# Patient Record
Sex: FEMALE | Race: WHITE | NOT HISPANIC OR LATINO | Employment: OTHER | URBAN - METROPOLITAN AREA
[De-identification: names, ages, dates, MRNs, and addresses within clinical notes are randomized per-mention and may not be internally consistent; named-entity substitution may affect disease eponyms.]

---

## 2024-09-05 ENCOUNTER — TELEPHONE (OUTPATIENT)
Dept: UROLOGY | Facility: CLINIC | Age: 74
End: 2024-09-05
Payer: MEDICARE

## 2024-09-05 DIAGNOSIS — N39.0 UTI (URINARY TRACT INFECTION): Primary | ICD-10-CM

## 2024-09-06 ENCOUNTER — OFFICE VISIT (OUTPATIENT)
Dept: UROLOGY | Facility: CLINIC | Age: 74
End: 2024-09-06
Payer: MEDICARE

## 2024-09-06 VITALS
HEIGHT: 63 IN | BODY MASS INDEX: 24.1 KG/M2 | OXYGEN SATURATION: 97 % | WEIGHT: 136 LBS | SYSTOLIC BLOOD PRESSURE: 130 MMHG | DIASTOLIC BLOOD PRESSURE: 78 MMHG | HEART RATE: 97 BPM

## 2024-09-06 DIAGNOSIS — N32.81 OAB (OVERACTIVE BLADDER): Primary | ICD-10-CM

## 2024-09-06 DIAGNOSIS — N39.0 FREQUENT UTI: ICD-10-CM

## 2024-09-06 LAB
BILIRUBIN, UA POC OHS: NEGATIVE
BLOOD, UA POC OHS: NEGATIVE
CLARITY, UA POC OHS: CLEAR
COLOR, UA POC OHS: YELLOW
GLUCOSE, UA POC OHS: NEGATIVE
KETONES, UA POC OHS: NEGATIVE
LEUKOCYTES, UA POC OHS: ABNORMAL
NITRITE, UA POC OHS: NEGATIVE
PH, UA POC OHS: 6
PROTEIN, UA POC OHS: NEGATIVE
SPECIFIC GRAVITY, UA POC OHS: <=1.005
UROBILINOGEN, UA POC OHS: 0.2

## 2024-09-06 RX ORDER — NITROFURANTOIN 25; 75 MG/1; MG/1
100 CAPSULE ORAL 2 TIMES DAILY
COMMUNITY
Start: 2024-09-03

## 2024-09-06 RX ORDER — PANTOPRAZOLE SODIUM 40 MG/1
TABLET, DELAYED RELEASE ORAL
COMMUNITY
Start: 2024-08-29

## 2024-09-06 RX ORDER — GABAPENTIN 600 MG/1
600 TABLET ORAL 3 TIMES DAILY
COMMUNITY
Start: 2024-08-25

## 2024-09-06 RX ORDER — PRAVASTATIN SODIUM 40 MG/1
TABLET ORAL
COMMUNITY
Start: 2024-08-21

## 2024-09-06 RX ORDER — SULFAMETHOXAZOLE AND TRIMETHOPRIM 800; 160 MG/1; MG/1
TABLET ORAL
COMMUNITY
Start: 2024-07-16

## 2024-09-06 RX ORDER — ALENDRONATE SODIUM TABLET 35 MG/1
TABLET ORAL
COMMUNITY
Start: 2024-07-21

## 2024-09-06 RX ORDER — CHLORDIAZEPOXIDE HYDROCHLORIDE 10 MG/1
10 CAPSULE, GELATIN COATED ORAL 2 TIMES DAILY
COMMUNITY
Start: 2024-08-27

## 2024-09-06 RX ORDER — LAMOTRIGINE 25 MG/1
25 TABLET ORAL 2 TIMES DAILY
COMMUNITY
Start: 2024-07-16

## 2024-09-06 RX ORDER — FUROSEMIDE 20 MG/1
TABLET ORAL
COMMUNITY
Start: 2024-07-30

## 2024-09-06 RX ORDER — METOPROLOL TARTRATE 25 MG/1
25 TABLET, FILM COATED ORAL
COMMUNITY
Start: 2024-08-30

## 2024-09-06 NOTE — PROGRESS NOTES
Subjective:       Patient ID: Amelia Pizarro is a 74 y.o. female.    Chief Complaint: Urinary Tract Infection      HPI: 74-year-old female new patient presents today for frequent urinary tract infections.  Patient reports around labor day she developed gross hematuria and severe dysuria with lower abdominal pain.  She was started on antibiotics which resolved her symptoms however several days after stopping her symptoms of lower abdominal pain and dysuria returned.  Patient reports she also begins experiencing urinary urgency and frequency.  She called her primary care doctor who started her on Macrobid which she is currently taking. Patient reports prior to her urinary tract infection she was experiencing some urinary urgency with leakage.  She has to wear Kotex pads during the day and is currently changing 3 large pads per day.  Today she reports despite being on Macrobid she is still experiencing some urinary urgency with leakage and dysuria at the very end of her void.       Past Medical History:   Past Medical History:   Diagnosis Date    Anemia, unspecified     Anxiety disorder, unspecified     Depression     Elevated cholesterol     Fluid retention in legs     GERD with esophagitis     HTN (hypertension)     Neuropathy     Stroke        Past Surgical Historical:   Past Surgical History:   Procedure Laterality Date    blood disease      BRAIN SURGERY      SURGICAL REMOVAL OF ULCER      TUMOR REMOVAL          Medications:   Medication List with Changes/Refills   Current Medications    ALENDRONATE (FOSAMAX) 35 MG TABLET        CHLORDIAZEPOXIDE (LIBRIUM) 10 MG CAPSULE    Take 10 mg by mouth 2 (two) times daily.    FUROSEMIDE (LASIX) 20 MG TABLET        GABAPENTIN (NEURONTIN) 600 MG TABLET    Take 600 mg by mouth 3 (three) times daily.    LAMOTRIGINE (LAMICTAL) 25 MG TABLET    Take 25 mg by mouth 2 (two) times daily.    METOPROLOL TARTRATE (LOPRESSOR) 25 MG TABLET    Take 25 mg by mouth.    NITROFURANTOIN,  MACROCRYSTAL-MONOHYDRATE, (MACROBID) 100 MG CAPSULE    Take 100 mg by mouth 2 (two) times daily.    PANTOPRAZOLE (PROTONIX) 40 MG TABLET        PRAVASTATIN (PRAVACHOL) 40 MG TABLET        SULFAMETHOXAZOLE-TRIMETHOPRIM 800-160MG (BACTRIM DS) 800-160 MG TAB            Past Social History:   Social History     Socioeconomic History    Marital status:    Tobacco Use    Smoking status: Never     Passive exposure: Never    Smokeless tobacco: Never   Substance and Sexual Activity    Alcohol use: Never    Drug use: Never    Sexual activity: Not Currently     Partners: Male       Allergies:   Review of patient's allergies indicates:   Allergen Reactions    Pcn [penicillins]         Family History:   Family History   Problem Relation Name Age of Onset    Hypertension Father      Coronary artery disease Father      Hypertension Mother      Coronary artery disease Mother          Review of Systems:  Review of Systems   Constitutional:  Negative for activity change, appetite change, chills, diaphoresis, fatigue, fever and unexpected weight change.   HENT:  Negative for congestion, dental problem, drooling, ear discharge, ear pain, facial swelling, hearing loss, mouth sores, nosebleeds, postnasal drip, rhinorrhea, sinus pressure, sinus pain, sneezing, sore throat, tinnitus, trouble swallowing and voice change.    Eyes:  Negative for photophobia, pain, discharge, redness, itching and visual disturbance.   Respiratory:  Negative for apnea, cough, choking, chest tightness, shortness of breath, wheezing and stridor.    Cardiovascular:  Negative for chest pain and leg swelling.   Gastrointestinal:  Negative for abdominal distention, abdominal pain, anal bleeding, blood in stool, constipation, diarrhea, nausea, rectal pain and vomiting.   Endocrine: Negative for cold intolerance, heat intolerance, polydipsia, polyphagia and polyuria.   Genitourinary:  Positive for dysuria, frequency and urgency. Negative for decreased urine  volume, difficulty urinating, dyspareunia, enuresis, flank pain, genital sores, hematuria, menstrual problem, pelvic pain, vaginal bleeding, vaginal discharge and vaginal pain.   Musculoskeletal:  Negative for arthralgias, back pain, gait problem, joint swelling, myalgias, neck pain and neck stiffness.   Skin:  Negative for color change, pallor, rash and wound.   Allergic/Immunologic: Negative for environmental allergies, food allergies and immunocompromised state.   Neurological:  Negative for dizziness, tremors, seizures, syncope, facial asymmetry, speech difficulty, weakness, light-headedness, numbness and headaches.   Hematological:  Negative for adenopathy. Does not bruise/bleed easily.   Psychiatric/Behavioral:  Negative for agitation, behavioral problems, confusion, decreased concentration, dysphoric mood, hallucinations, self-injury, sleep disturbance and suicidal ideas. The patient is not nervous/anxious and is not hyperactive.      Physical Exam:  Physical Exam  Cardiovascular:      Rate and Rhythm: Normal rate.   Pulmonary:      Effort: Pulmonary effort is normal.   Abdominal:      General: Abdomen is flat. Bowel sounds are normal.      Palpations: Abdomen is soft.   Neurological:      Mental Status: She is alert and oriented to person, place, and time.     Assessment/Plan:     Frequent UTI:  Patient is currently on Macrobid prescribed by her PCP.Education provided to patient about lifestyle changes for prevention of urinary tract infection such as appropriate Ariana care, use of cotton underwear, avoiding scented soaps or douches, and ensuring fully emptying bladder with voiding..  We also discussed providing urine drop offs in the clinic at the start of any symptoms to build history with urine culture.    Overactive bladder:  Patient is experiencing urinary urgency with leakage changing 3 pads per day ongoing for the last several months.  We will provide her with 1 month samples of Gemtessa.  Discussed  effects of caffeine, tea, sodas, and alcohol on OAB symptoms. Instructed patient to decrease consumption of these fluids and to stop oral fluids approx 4 hours prior to bedtime to decrease night time symptoms. Education provided on bladder training, bladder control strategies, and PFT. Patient verbalized understanding.    Follow up in 1 month   Problem List Items Addressed This Visit    None  Visit Diagnoses       OAB (overactive bladder)    -  Primary    Frequent UTI

## 2024-09-07 LAB — URINE CULTURE, ROUTINE: NORMAL

## 2024-10-07 ENCOUNTER — OFFICE VISIT (OUTPATIENT)
Dept: UROLOGY | Facility: CLINIC | Age: 74
End: 2024-10-07
Payer: MEDICARE

## 2024-10-07 VITALS
WEIGHT: 136 LBS | BODY MASS INDEX: 24.1 KG/M2 | OXYGEN SATURATION: 95 % | HEART RATE: 72 BPM | SYSTOLIC BLOOD PRESSURE: 140 MMHG | HEIGHT: 63 IN | DIASTOLIC BLOOD PRESSURE: 80 MMHG

## 2024-10-07 DIAGNOSIS — N32.81 OAB (OVERACTIVE BLADDER): Primary | ICD-10-CM

## 2024-10-07 DIAGNOSIS — R82.90 ABNORMAL URINALYSIS: ICD-10-CM

## 2024-10-07 LAB
BILIRUBIN, UA POC OHS: NEGATIVE
BLOOD, UA POC OHS: ABNORMAL
CLARITY, UA POC OHS: ABNORMAL
COLOR, UA POC OHS: YELLOW
GLUCOSE, UA POC OHS: NEGATIVE
KETONES, UA POC OHS: NEGATIVE
LEUKOCYTES, UA POC OHS: ABNORMAL
NITRITE, UA POC OHS: NEGATIVE
PH, UA POC OHS: 5.5
POC RESIDUAL URINE VOLUME: 0 ML (ref 0–100)
PROTEIN, UA POC OHS: NEGATIVE
SPECIFIC GRAVITY, UA POC OHS: 1.01
UROBILINOGEN, UA POC OHS: 0.2

## 2024-10-07 PROCEDURE — 3079F DIAST BP 80-89 MM HG: CPT | Mod: CPTII,S$GLB,,

## 2024-10-07 PROCEDURE — 81003 URINALYSIS AUTO W/O SCOPE: CPT | Mod: QW,S$GLB,,

## 2024-10-07 PROCEDURE — 51798 US URINE CAPACITY MEASURE: CPT | Mod: S$GLB,,,

## 2024-10-07 PROCEDURE — 1160F RVW MEDS BY RX/DR IN RCRD: CPT | Mod: CPTII,S$GLB,,

## 2024-10-07 PROCEDURE — 1125F AMNT PAIN NOTED PAIN PRSNT: CPT | Mod: CPTII,S$GLB,,

## 2024-10-07 PROCEDURE — 3288F FALL RISK ASSESSMENT DOCD: CPT | Mod: CPTII,S$GLB,,

## 2024-10-07 PROCEDURE — 3077F SYST BP >= 140 MM HG: CPT | Mod: CPTII,S$GLB,,

## 2024-10-07 PROCEDURE — 1159F MED LIST DOCD IN RCRD: CPT | Mod: CPTII,S$GLB,,

## 2024-10-07 PROCEDURE — 1101F PT FALLS ASSESS-DOCD LE1/YR: CPT | Mod: CPTII,S$GLB,,

## 2024-10-07 PROCEDURE — 99214 OFFICE O/P EST MOD 30 MIN: CPT | Mod: S$GLB,,,

## 2024-10-07 RX ORDER — NITROFURANTOIN 25; 75 MG/1; MG/1
100 CAPSULE ORAL 2 TIMES DAILY
Qty: 14 CAPSULE | Refills: 0 | Status: SHIPPED | OUTPATIENT
Start: 2024-10-07 | End: 2024-10-14

## 2024-10-07 NOTE — PROGRESS NOTES
Subjective:       Patient ID: Amelia Pizarro is a 74 y.o. female.    Chief Complaint: No chief complaint on file.      HPI: 74-year-old female patient presents today for follow up of overactive bladder.  Patient was previously experiencing some urinary frequency, urgency, and leakage.  She was also complaining of some abdominal pressure.  She was started on Gemtessa daily and reports today that she no longer is experiencing any leakage, frequency, or urgency.  She does complain however that in the last 2-3 days she has developed a foul odor to her urine associated with dizziness and headaches.  Her urinalysis is abnormal today but her PVR is 0 mL.  She currently denies dysuria, frequency, urgency, hematuria, fever or chills.       Past Medical History:   Past Medical History:   Diagnosis Date    Anemia, unspecified     Anxiety disorder, unspecified     Depression     Elevated cholesterol     Fluid retention in legs     GERD with esophagitis     HTN (hypertension)     Neuropathy     Stroke        Past Surgical Historical:   Past Surgical History:   Procedure Laterality Date    blood disease      BRAIN SURGERY      SURGICAL REMOVAL OF ULCER      TUMOR REMOVAL          Medications:   Medication List with Changes/Refills   New Medications    NITROFURANTOIN, MACROCRYSTAL-MONOHYDRATE, (MACROBID) 100 MG CAPSULE    Take 1 capsule (100 mg total) by mouth 2 (two) times daily. for 7 days    VIBEGRON 75 MG TAB    Take 1 tablet (75 mg total) by mouth once daily.   Current Medications    ALENDRONATE (FOSAMAX) 35 MG TABLET        CHLORDIAZEPOXIDE (LIBRIUM) 10 MG CAPSULE    Take 10 mg by mouth 2 (two) times daily.    FUROSEMIDE (LASIX) 20 MG TABLET        GABAPENTIN (NEURONTIN) 600 MG TABLET    Take 600 mg by mouth 3 (three) times daily.    LAMOTRIGINE (LAMICTAL) 25 MG TABLET    Take 25 mg by mouth 2 (two) times daily.    METOPROLOL TARTRATE (LOPRESSOR) 25 MG TABLET    Take 25 mg by mouth.    PANTOPRAZOLE (PROTONIX) 40 MG TABLET         PRAVASTATIN (PRAVACHOL) 40 MG TABLET       Discontinued Medications    NITROFURANTOIN, MACROCRYSTAL-MONOHYDRATE, (MACROBID) 100 MG CAPSULE    Take 100 mg by mouth 2 (two) times daily.    SULFAMETHOXAZOLE-TRIMETHOPRIM 800-160MG (BACTRIM DS) 800-160 MG TAB            Past Social History:   Social History     Socioeconomic History    Marital status:    Tobacco Use    Smoking status: Never     Passive exposure: Never    Smokeless tobacco: Never   Substance and Sexual Activity    Alcohol use: Never    Drug use: Never    Sexual activity: Not Currently     Partners: Male       Allergies:   Review of patient's allergies indicates:   Allergen Reactions    Pcn [penicillins]         Family History:   Family History   Problem Relation Name Age of Onset    Hypertension Father      Coronary artery disease Father      Hypertension Mother      Coronary artery disease Mother          Review of Systems:  Review of Systems   Constitutional:  Negative for activity change, appetite change, chills, diaphoresis, fatigue, fever and unexpected weight change.   HENT:  Negative for congestion, dental problem, drooling, ear discharge, ear pain, facial swelling, hearing loss, mouth sores, nosebleeds, postnasal drip, rhinorrhea, sinus pressure, sinus pain, sneezing, sore throat, tinnitus, trouble swallowing and voice change.    Eyes:  Negative for photophobia, pain, discharge, redness, itching and visual disturbance.   Respiratory:  Negative for apnea, cough, choking, chest tightness, shortness of breath, wheezing and stridor.    Cardiovascular:  Negative for chest pain and leg swelling.   Gastrointestinal:  Negative for abdominal distention, abdominal pain, anal bleeding, blood in stool, constipation, diarrhea, nausea, rectal pain and vomiting.   Endocrine: Negative for cold intolerance, heat intolerance, polydipsia, polyphagia and polyuria.   Genitourinary: Negative.  Negative for decreased urine volume, difficulty urinating,  dyspareunia, dysuria, enuresis, flank pain, frequency, genital sores, hematuria, menstrual problem, pelvic pain, urgency, vaginal bleeding, vaginal discharge and vaginal pain.   Musculoskeletal:  Negative for arthralgias, back pain, gait problem, joint swelling, myalgias, neck pain and neck stiffness.   Skin:  Negative for color change, pallor, rash and wound.   Allergic/Immunologic: Negative for environmental allergies, food allergies and immunocompromised state.   Neurological:  Positive for headaches. Negative for dizziness, tremors, seizures, syncope, facial asymmetry, speech difficulty, weakness, light-headedness and numbness.   Hematological:  Negative for adenopathy. Does not bruise/bleed easily.   Psychiatric/Behavioral:  Negative for agitation, behavioral problems, confusion, decreased concentration, dysphoric mood, hallucinations, self-injury, sleep disturbance and suicidal ideas. The patient is not nervous/anxious and is not hyperactive.      Physical Exam:  Physical Exam  Cardiovascular:      Rate and Rhythm: Normal rate.   Pulmonary:      Effort: Pulmonary effort is normal.   Abdominal:      General: Abdomen is flat. Bowel sounds are normal.      Palpations: Abdomen is soft.   Neurological:      Mental Status: She is alert and oriented to person, place, and time.   Urinalysis: WBCs 50-75, RBCs 0-3 trace, epithelial 2+, bacteria 4+, nitrite negative     PVR 0 mL  Assessment/Plan:     Overactive bladder: Patient responded well to Gemtessa and overall is happy with the medication.  We will send a prescription to her pharmacy.  Discussed effects of caffeine, tea, sodas, and alcohol on OAB symptoms. Instructed patient to decrease consumption of these fluids and to stop oral fluids approx 4 hours prior to bedtime to decrease night time symptoms. Education provided on bladder training, bladder control strategies, and PFT. Patient verbalized understanding.    Abnormal urinalysis:  Patient is having some  dizziness, headaches, and foul odor to her urine.  Her urinalysis is abnormal so we will start her empirically on  Macrobid and send her urine for culture.  We will call patient with results and change antibiotics if indicated     Follow up in 6 months  Problem List Items Addressed This Visit    None  Visit Diagnoses       OAB (overactive bladder)    -  Primary    Relevant Orders    POCT Urinalysis(Instrument) (Completed)    POCT Bladder Scan (Completed)    Abnormal urinalysis        Relevant Orders    Urine culture

## 2024-10-09 ENCOUNTER — TELEPHONE (OUTPATIENT)
Dept: UROLOGY | Facility: CLINIC | Age: 74
End: 2024-10-09
Payer: MEDICARE

## 2024-10-09 LAB — URINE CULTURE, ROUTINE: NORMAL

## 2024-10-09 NOTE — TELEPHONE ENCOUNTER
Attempted to contact pt regarding results, left VM.    ----- Message from Maritza Hurley NP sent at 10/9/2024  9:08 AM CDT -----  Please call and inform patient her urine culture grew E coli and she is currently taking Macrobid which is the appropriate antibiotic.  Instruct her to continue the entire course and follow up as previously discussed

## 2025-04-07 ENCOUNTER — TELEPHONE (OUTPATIENT)
Dept: UROLOGY | Facility: CLINIC | Age: 75
End: 2025-04-07

## 2025-04-07 ENCOUNTER — CLINICAL SUPPORT (OUTPATIENT)
Dept: UROLOGY | Facility: CLINIC | Age: 75
End: 2025-04-07
Payer: MEDICARE

## 2025-04-07 DIAGNOSIS — N39.0 FREQUENT UTI: Primary | ICD-10-CM

## 2025-04-07 LAB
BILIRUBIN, UA POC OHS: NEGATIVE
BLOOD, UA POC OHS: NEGATIVE
CLARITY, UA POC OHS: ABNORMAL
COLOR, UA POC OHS: YELLOW
GLUCOSE, UA POC OHS: NEGATIVE
KETONES, UA POC OHS: NEGATIVE
LEUKOCYTES, UA POC OHS: ABNORMAL
NITRITE, UA POC OHS: POSITIVE
PH, UA POC OHS: 5.5
PROTEIN, UA POC OHS: NEGATIVE
SPECIFIC GRAVITY, UA POC OHS: 1.01
UROBILINOGEN, UA POC OHS: 0.2

## 2025-04-07 RX ORDER — SULFAMETHOXAZOLE AND TRIMETHOPRIM 800; 160 MG/1; MG/1
1 TABLET ORAL 2 TIMES DAILY
Qty: 14 TABLET | Refills: 0 | Status: SHIPPED | OUTPATIENT
Start: 2025-04-07 | End: 2025-04-14

## 2025-04-07 NOTE — TELEPHONE ENCOUNTER
Unable to reach pt in regards to u/a results. Sending bactrim to pharmacy on file, culture has been ordered. Provided call back number for any questions or concerns via .

## 2025-04-07 NOTE — PROGRESS NOTES
"Proceeded to clinic for u/a drop off. Per urinalysis questionnaire, s/s " hurting bottom of stomach and back and burning and ordor, occurring for one month.     "

## 2025-04-07 NOTE — TELEPHONE ENCOUNTER
Pt returned call to clinic, notified by Ledy Prasad via messages. Informed pt Bactrim sent out to pharmacy, instructed to take twice a day for 7 days, increase water intake. Will call once culture results have been received and reviewed. Verbalized understanding.

## 2025-04-10 ENCOUNTER — TELEPHONE (OUTPATIENT)
Dept: UROLOGY | Facility: CLINIC | Age: 75
End: 2025-04-10
Payer: MEDICARE

## 2025-04-10 ENCOUNTER — RESULTS FOLLOW-UP (OUTPATIENT)
Dept: UROLOGY | Facility: CLINIC | Age: 75
End: 2025-04-10

## 2025-04-10 LAB — URINE CULTURE, ROUTINE: NORMAL

## 2025-04-10 NOTE — TELEPHONE ENCOUNTER
Spoke with pt and informed of results.     ----- Message from Arlette Keyes NP sent at 4/10/2025  8:27 AM CDT -----  Please notify patient of culture results. Appropriate antibiotic. Complete full course.  ----- Message -----  From: Kait Velazquez  Sent: 4/7/2025   4:35 PM CDT  To: Arlette Keyes NP

## 2025-04-23 ENCOUNTER — TELEPHONE (OUTPATIENT)
Dept: UROLOGY | Facility: CLINIC | Age: 75
End: 2025-04-23
Payer: MEDICARE

## 2025-04-23 NOTE — TELEPHONE ENCOUNTER
----- Message from Heena sent at 4/23/2025  3:53 PM CDT -----  Contact: ALBA OLSEN [[id number]]  .Type:  Patient Requesting CallWho Called:ALBA OLSEN [[id number]]Does the patient know what this is regarding?:pt is calling to speak with nurse about appt 4/25, asking can she get a later time if availableWould the patient rather a call back or a response via [facility]? [facility] Call Back Number:.[phone] Additional Information:   Negative

## 2025-04-24 ENCOUNTER — TELEPHONE (OUTPATIENT)
Dept: UROLOGY | Facility: CLINIC | Age: 75
End: 2025-04-24
Payer: MEDICARE

## 2025-04-24 NOTE — TELEPHONE ENCOUNTER
Spoke with pt and rescheduled appointment.     ----- Message from Jackeline sent at 4/24/2025  4:38 PM CDT -----  Patient is calling to have appointment push back until 2:00 pm.  Please call her back at [phone]

## 2025-05-19 ENCOUNTER — TELEPHONE (OUTPATIENT)
Dept: UROLOGY | Facility: CLINIC | Age: 75
End: 2025-05-19
Payer: MEDICARE

## 2025-05-19 NOTE — TELEPHONE ENCOUNTER
Pt will be in tomorrow for ua drop off. She is c/o burning and pain with urination.    ----- Message from Iman sent at 5/19/2025  3:50 PM CDT -----  Contact: self  Type:  Sooner Apoointment RequestCaller is requesting a sooner appointment.  Caller declined first available appointment listed below.  Caller will not accept being placed on the waitlist and is requesting a message be sent to doctor.Name of Caller:Amelia Ovalles is the first available appointment?06/2025Symptoms:possible UTI, burning, pain in abdominWould the patient rather a call back or a response via [facility]? [facility] Call Back Number:[phone] Information: [id number] - [place] - [address]  [address] [facility] [id number] [place] [address]Fkwlz: [phone] Fax: [phone]

## 2025-05-20 ENCOUNTER — TELEPHONE (OUTPATIENT)
Dept: UROLOGY | Facility: CLINIC | Age: 75
End: 2025-05-20
Payer: MEDICARE

## 2025-05-20 NOTE — TELEPHONE ENCOUNTER
Returned call to pt, she was unable to come in today to drop a urine off. Pt states she was not feeling well and did not want to drive. Pt states she is feeling under the weather and feels she has a uti. I advised pt to please proceed to our office tomorrow for a ua drop and or proceed to the hospital with worsening symptoms, pain, fever, unable to urinate, ect.    Pt verbalized understanding.    ----- Message from [email or web address] sent at 5/20/2025 12:54 PM CDT -----  Contact: ALBA OLSEN [[id number]]  ..Type:  Patient Requesting CallWho Called:ALBA OLSEN [[id number]]Would the patient rather a call back or a response via [facility]? [email or web address] Call Back Number:.[phone] (home) Additional Information: Patient called to schedule an appointment with provider.

## 2025-05-21 ENCOUNTER — CLINICAL SUPPORT (OUTPATIENT)
Dept: UROLOGY | Facility: CLINIC | Age: 75
End: 2025-05-21
Payer: MEDICARE

## 2025-05-21 DIAGNOSIS — R82.90 ABNORMAL URINALYSIS: Primary | ICD-10-CM

## 2025-05-21 DIAGNOSIS — N39.0 URINARY TRACT INFECTION WITHOUT HEMATURIA, SITE UNSPECIFIED: Primary | ICD-10-CM

## 2025-05-21 LAB
BILIRUBIN, UA POC OHS: NEGATIVE
BLOOD, UA POC OHS: ABNORMAL
CLARITY, UA POC OHS: ABNORMAL
COLOR, UA POC OHS: YELLOW
GLUCOSE, UA POC OHS: NEGATIVE
KETONES, UA POC OHS: NEGATIVE
LEUKOCYTES, UA POC OHS: ABNORMAL
NITRITE, UA POC OHS: POSITIVE
PH, UA POC OHS: 6
PROTEIN, UA POC OHS: NEGATIVE
SPECIFIC GRAVITY, UA POC OHS: 1.01
UROBILINOGEN, UA POC OHS: 1

## 2025-05-21 RX ORDER — SULFAMETHOXAZOLE AND TRIMETHOPRIM 800; 160 MG/1; MG/1
1 TABLET ORAL 2 TIMES DAILY
Qty: 14 TABLET | Refills: 0 | Status: SHIPPED | OUTPATIENT
Start: 2025-05-21 | End: 2025-05-28

## 2025-05-21 NOTE — TELEPHONE ENCOUNTER
Called pt to inform her of urine results, we will culture urine, and start pt on Bactrrim BID for 7 days sent to [facility]. We will call pt with urine culture results.

## 2025-05-21 NOTE — PROGRESS NOTES
Patient came in today for a UA drop off.    Symptoms include: dysuria, lower back pain, stomach odor, headaches, chills.   How Long have symptoms been occurrin weeks  Currently taking medications: no  Drug Allergies: Penicillin   [facility]    We will call patient with results.     Trace Blood  Nitrate positive   Large Leukocytes

## 2025-05-23 LAB — URINE CULTURE, ROUTINE: NORMAL

## 2025-05-27 ENCOUNTER — RESULTS FOLLOW-UP (OUTPATIENT)
Dept: UROLOGY | Facility: CLINIC | Age: 75
End: 2025-05-27

## 2025-05-28 ENCOUNTER — TELEPHONE (OUTPATIENT)
Dept: UROLOGY | Facility: CLINIC | Age: 75
End: 2025-05-28
Payer: MEDICARE

## 2025-05-28 NOTE — TELEPHONE ENCOUNTER
Spoke with Patient - CAlled and spoke with Amelia, gave her the culture results, she stated she is finishing up the medication and will come back in next week to recheck her urine if shes still having issues.         ----- Message from Arlette Keyes NP sent at 5/27/2025  8:27 AM CDT -----  Please notify patient she is on the appropriate antibiotic.  Complete full course  ----- Message -----  From: Kait Velazquez  Sent: 5/21/2025   4:09 PM CDT  To: Arlette Keyes NP

## 2025-07-14 ENCOUNTER — CLINICAL SUPPORT (OUTPATIENT)
Dept: UROLOGY | Facility: CLINIC | Age: 75
End: 2025-07-14
Payer: MEDICARE

## 2025-07-14 DIAGNOSIS — N39.0 URINARY TRACT INFECTION WITHOUT HEMATURIA, SITE UNSPECIFIED: Primary | ICD-10-CM

## 2025-07-14 LAB
BILIRUBIN, UA POC OHS: NEGATIVE
BLOOD, UA POC OHS: ABNORMAL
CLARITY, UA POC OHS: CLEAR
COLOR, UA POC OHS: YELLOW
GLUCOSE, UA POC OHS: NEGATIVE
KETONES, UA POC OHS: NEGATIVE
LEUKOCYTES, UA POC OHS: ABNORMAL
NITRITE, UA POC OHS: NEGATIVE
PH, UA POC OHS: 5.5
PROTEIN, UA POC OHS: NEGATIVE
SPECIFIC GRAVITY, UA POC OHS: <=1.005
UROBILINOGEN, UA POC OHS: 0.2

## 2025-07-14 RX ORDER — SULFAMETHOXAZOLE AND TRIMETHOPRIM 800; 160 MG/1; MG/1
1 TABLET ORAL 2 TIMES DAILY
Qty: 14 TABLET | Refills: 0 | Status: SHIPPED | OUTPATIENT
Start: 2025-07-14 | End: 2025-07-21

## 2025-07-14 RX ORDER — PHENAZOPYRIDINE HYDROCHLORIDE 200 MG/1
200 TABLET, FILM COATED ORAL 3 TIMES DAILY PRN
Qty: 15 TABLET | Refills: 0 | Status: SHIPPED | OUTPATIENT
Start: 2025-07-14 | End: 2025-07-24

## 2025-07-14 NOTE — PROGRESS NOTES
Pt proceeded to clinic for ua drop off. We will culture urine and send out bactrim bid for 7 days.

## 2025-07-16 ENCOUNTER — TELEPHONE (OUTPATIENT)
Dept: UROLOGY | Facility: CLINIC | Age: 75
End: 2025-07-16
Payer: MEDICARE

## 2025-07-16 LAB — URINE CULTURE, ROUTINE: NORMAL

## 2025-07-16 NOTE — TELEPHONE ENCOUNTER
Attempted to call patient and went to voicemail.  Left message as reads below from LUIS Keyes NP----- Message from Arlette Keyes NP sent at 7/16/2025 11:44 AM CDT -----  Please notify patient of urine culture results.  Bactrim appropriate.  Complete full course  ----- Message -----  From: Kait Velazquez  Sent: 7/14/2025   3:18 PM CDT  To: Arlette Keyes NP

## 2025-07-17 ENCOUNTER — TELEPHONE (OUTPATIENT)
Dept: UROLOGY | Facility: CLINIC | Age: 75
End: 2025-07-17
Payer: MEDICARE

## 2025-07-17 NOTE — TELEPHONE ENCOUNTER
Callback to pt, c/o itchiness, hot and shaky. She is wanting to know if she's had this medication in the past, advised that she has taken this medication in April and May. With confirmation pt seems to think it may not be the abx and possibly iron pill she recently started taking. Advised to contact prescriber and discuss. Pt verbalized understanding. BJP     Copied from CRM #[id number]. Topic: General Inquiry - Patient   >> Jul 17, 2025  1:13 PM Heena wrote:  Type:  Needs Medical Advice    Who Called: Amelia Moira  Symptoms (please be specific): itching, hot, shakey  How long has patient had these symptoms:  pt states after taking medication she started having those symptoms  Pharmacy name and phone #:  .  [id number] - [place] - [address] AT [facility]  [address]  [place] [address]  Phone: [phone] Fax: [phone]    Best call back number :[phone]

## 2025-07-21 ENCOUNTER — OFFICE VISIT (OUTPATIENT)
Dept: UROLOGY | Facility: CLINIC | Age: 75
End: 2025-07-21
Payer: MEDICARE

## 2025-07-21 VITALS
BODY MASS INDEX: 23.74 KG/M2 | WEIGHT: 134 LBS | HEIGHT: 63 IN | SYSTOLIC BLOOD PRESSURE: 145 MMHG | HEART RATE: 59 BPM | DIASTOLIC BLOOD PRESSURE: 66 MMHG

## 2025-07-21 DIAGNOSIS — N39.0 FREQUENT UTI: ICD-10-CM

## 2025-07-21 DIAGNOSIS — N32.81 OAB (OVERACTIVE BLADDER): Primary | ICD-10-CM

## 2025-07-21 LAB
BILIRUBIN, UA POC OHS: NEGATIVE
BLOOD, UA POC OHS: NEGATIVE
CLARITY, UA POC OHS: CLEAR
COLOR, UA POC OHS: YELLOW
GLUCOSE, UA POC OHS: NEGATIVE
KETONES, UA POC OHS: NEGATIVE
LEUKOCYTES, UA POC OHS: NEGATIVE
NITRITE, UA POC OHS: NEGATIVE
PH, UA POC OHS: 6
PROTEIN, UA POC OHS: NEGATIVE
SPECIFIC GRAVITY, UA POC OHS: 1.01
UROBILINOGEN, UA POC OHS: 0.2

## 2025-07-21 PROCEDURE — 3288F FALL RISK ASSESSMENT DOCD: CPT | Mod: CPTII,,, | Performed by: FAMILY MEDICINE

## 2025-07-21 PROCEDURE — 99214 OFFICE O/P EST MOD 30 MIN: CPT | Mod: S$PBB,,, | Performed by: FAMILY MEDICINE

## 2025-07-21 PROCEDURE — 1126F AMNT PAIN NOTED NONE PRSNT: CPT | Mod: CPTII,,, | Performed by: FAMILY MEDICINE

## 2025-07-21 PROCEDURE — 1101F PT FALLS ASSESS-DOCD LE1/YR: CPT | Mod: CPTII,,, | Performed by: FAMILY MEDICINE

## 2025-07-21 PROCEDURE — 3077F SYST BP >= 140 MM HG: CPT | Mod: CPTII,,, | Performed by: FAMILY MEDICINE

## 2025-07-21 PROCEDURE — 1159F MED LIST DOCD IN RCRD: CPT | Mod: CPTII,,, | Performed by: FAMILY MEDICINE

## 2025-07-21 PROCEDURE — 3078F DIAST BP <80 MM HG: CPT | Mod: CPTII,,, | Performed by: FAMILY MEDICINE

## 2025-07-21 RX ORDER — FERROUS SULFATE 325(65) MG
TABLET ORAL
COMMUNITY
Start: 2025-07-09 | End: 2026-07-09

## 2025-07-21 RX ORDER — DULOXETIN HYDROCHLORIDE 30 MG/1
30 CAPSULE, DELAYED RELEASE ORAL 2 TIMES DAILY
COMMUNITY

## 2025-07-21 NOTE — PROGRESS NOTES
Subjective:       Patient ID: Amelia Pizarro is a 75 y.o. female.    Chief Complaint: No chief complaint on file.      HPI: 75-year-old female patient following up for overactive bladder and frequent UTI. Recently finished Bactrim.  Doing well on Gemtesa.  She denies any symptoms of infection today.          Past Medical History:   Past Medical History:   Diagnosis Date    Anemia, unspecified     Anxiety disorder, unspecified     Depression     Elevated cholesterol     Fluid retention in legs     GERD with esophagitis     HTN (hypertension)     Neuropathy     Stroke        Past Surgical Historical:   Past Surgical History:   Procedure Laterality Date    blood disease      BRAIN SURGERY      SURGICAL REMOVAL OF ULCER      TUMOR REMOVAL          Medications:   Medication List with Changes/Refills   Current Medications    ALENDRONATE (FOSAMAX) 35 MG TABLET        CHLORDIAZEPOXIDE (LIBRIUM) 10 MG CAPSULE    Take 10 mg by mouth 2 (two) times daily.    DULOXETINE (CYMBALTA) 30 MG CAPSULE    Take 30 mg by mouth 2 (two) times daily.    FERROUS SULFATE (FEOSOL) 325 MG (65 MG IRON) TAB TABLET    Take by mouth.    FUROSEMIDE (LASIX) 20 MG TABLET        GABAPENTIN (NEURONTIN) 600 MG TABLET    Take 600 mg by mouth 3 (three) times daily.    LAMOTRIGINE (LAMICTAL) 25 MG TABLET    Take 25 mg by mouth 2 (two) times daily.    METOPROLOL TARTRATE (LOPRESSOR) 25 MG TABLET    Take 25 mg by mouth.    PANTOPRAZOLE (PROTONIX) 40 MG TABLET        PHENAZOPYRIDINE (PYRIDIUM) 200 MG TABLET    Take 1 tablet (200 mg total) by mouth 3 (three) times daily as needed for Pain.    PRAVASTATIN (PRAVACHOL) 40 MG TABLET        SULFAMETHOXAZOLE-TRIMETHOPRIM 800-160MG (BACTRIM DS) 800-160 MG TAB    Take 1 tablet by mouth 2 (two) times daily. for 7 days    VIBEGRON 75 MG TAB    Take 1 tablet (75 mg total) by mouth once daily.        Past Social History: Social History[1]    Allergies:   Review of patient's allergies indicates:   Allergen Reactions    Pcn  [penicillins]         Family History:   Family History   Problem Relation Name Age of Onset    Hypertension Father      Coronary artery disease Father      Hypertension Mother      Coronary artery disease Mother          Review of Systems:  Review of Systems   Constitutional:  Negative for activity change, appetite change, chills, diaphoresis, fatigue, fever and unexpected weight change.   HENT:  Negative for congestion, dental problem, drooling, ear discharge, ear pain, facial swelling, hearing loss, mouth sores, nosebleeds, postnasal drip, rhinorrhea, sinus pressure, sinus pain, sneezing, sore throat, tinnitus, trouble swallowing and voice change.    Eyes:  Negative for photophobia, pain, discharge, redness, itching and visual disturbance.   Respiratory:  Negative for apnea, cough, choking, chest tightness, shortness of breath, wheezing and stridor.    Cardiovascular:  Negative for chest pain and leg swelling.   Gastrointestinal:  Negative for abdominal distention, abdominal pain, anal bleeding, blood in stool, constipation, diarrhea, nausea, rectal pain and vomiting.   Endocrine: Negative for cold intolerance, heat intolerance, polydipsia, polyphagia and polyuria.   Genitourinary:  Positive for frequency. Negative for decreased urine volume, difficulty urinating, dyspareunia, dysuria, enuresis, flank pain, genital sores, hematuria, menstrual problem, pelvic pain, urgency, vaginal bleeding, vaginal discharge and vaginal pain.   Musculoskeletal:  Negative for arthralgias, back pain, gait problem, joint swelling, myalgias, neck pain and neck stiffness.   Skin:  Negative for color change, pallor, rash and wound.   Allergic/Immunologic: Negative for environmental allergies, food allergies and immunocompromised state.   Neurological:  Negative for dizziness, tremors, seizures, syncope, facial asymmetry, speech difficulty, weakness, light-headedness, numbness and headaches.   Hematological:  Negative for adenopathy.  Does not bruise/bleed easily.   Psychiatric/Behavioral:  Negative for agitation, behavioral problems, confusion, decreased concentration, dysphoric mood, hallucinations, self-injury, sleep disturbance and suicidal ideas. The patient is not nervous/anxious and is not hyperactive.        Physical Exam:  Physical Exam  Vitals reviewed.   Constitutional:       Appearance: She is normal weight.   HENT:      Head: Normocephalic.      Nose: Nose normal.      Mouth/Throat:      Mouth: Mucous membranes are moist.      Pharynx: Oropharynx is clear.   Eyes:      Extraocular Movements: Extraocular movements intact.      Conjunctiva/sclera: Conjunctivae normal.   Cardiovascular:      Rate and Rhythm: Normal rate.      Pulses: Normal pulses.   Pulmonary:      Effort: Pulmonary effort is normal. No respiratory distress.   Abdominal:      General: Abdomen is flat.   Musculoskeletal:         General: Normal range of motion.      Cervical back: Normal range of motion and neck supple.   Skin:     General: Skin is dry.   Neurological:      General: No focal deficit present.      Mental Status: She is alert and oriented to person, place, and time. Mental status is at baseline.   Psychiatric:         Mood and Affect: Mood normal.         Behavior: Behavior normal.         Thought Content: Thought content normal.         Judgment: Judgment normal.         Assessment/Plan:     Overactive bladder: Patient doing well on Gemtesa.  Postvoid 0 mL.  Urinalysis negative for infection.    Frequent urinary tract infection: Patient is frequency of infection has increased in the last few months.  Complete urine drop-off for any symptoms of infection in the future.  Urinalysis negative for infection today.  We did discuss starting daily Macrobid 100 mg if she continues to develop infections  Problem List Items Addressed This Visit    None              [1]   Social History  Socioeconomic History    Marital status:    Tobacco Use    Smoking  status: Never     Passive exposure: Never    Smokeless tobacco: Never   Substance and Sexual Activity    Alcohol use: Never    Drug use: Never    Sexual activity: Not Currently     Partners: Male